# Patient Record
Sex: MALE | Race: WHITE
[De-identification: names, ages, dates, MRNs, and addresses within clinical notes are randomized per-mention and may not be internally consistent; named-entity substitution may affect disease eponyms.]

---

## 2022-01-07 ENCOUNTER — HOSPITAL ENCOUNTER (INPATIENT)
Dept: HOSPITAL 35 - ER | Age: 57
LOS: 5 days | Discharge: HOME HEALTH | DRG: 854 | End: 2022-01-12
Attending: INTERNAL MEDICINE | Admitting: INTERNAL MEDICINE
Payer: COMMERCIAL

## 2022-01-07 VITALS — BODY MASS INDEX: 33.33 KG/M2 | WEIGHT: 225 LBS | HEIGHT: 69.02 IN

## 2022-01-07 VITALS — DIASTOLIC BLOOD PRESSURE: 71 MMHG | SYSTOLIC BLOOD PRESSURE: 117 MMHG

## 2022-01-07 VITALS — DIASTOLIC BLOOD PRESSURE: 68 MMHG | SYSTOLIC BLOOD PRESSURE: 116 MMHG

## 2022-01-07 DIAGNOSIS — E44.1: ICD-10-CM

## 2022-01-07 DIAGNOSIS — S91.301A: ICD-10-CM
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
## 2022-01-07 DIAGNOSIS — E11.69: ICD-10-CM
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
## 2022-01-07 DIAGNOSIS — A41.9: Primary | ICD-10-CM

## 2022-01-07 DIAGNOSIS — Z20.822: ICD-10-CM

## 2022-01-07 DIAGNOSIS — E11.42: ICD-10-CM

## 2022-01-07 DIAGNOSIS — E66.9: ICD-10-CM
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
## 2022-01-07 DIAGNOSIS — L02.611: ICD-10-CM

## 2022-01-07 DIAGNOSIS — L03.115: ICD-10-CM

## 2022-01-07 DIAGNOSIS — Z86.14: ICD-10-CM

## 2022-01-07 DIAGNOSIS — Z88.2: ICD-10-CM

## 2022-01-07 DIAGNOSIS — R53.81: ICD-10-CM

## 2022-01-07 DIAGNOSIS — G60.0: ICD-10-CM
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
## 2022-01-07 DIAGNOSIS — M86.8X7: ICD-10-CM

## 2022-01-07 LAB
ALBUMIN SERPL-MCNC: 3.4 G/DL (ref 3.4–5)
ALT SERPL-CCNC: 58 U/L (ref 16–63)
ANION GAP SERPL CALC-SCNC: 9 MMOL/L (ref 7–16)
APTT BLD: 30.1 SECONDS (ref 24.5–32.8)
AST SERPL-CCNC: 21 U/L (ref 15–37)
BILIRUB SERPL-MCNC: 0.9 MG/DL (ref 0.2–1)
BUN SERPL-MCNC: 18 MG/DL (ref 7–18)
CALCIUM SERPL-MCNC: 8.8 MG/DL (ref 8.5–10.1)
CHLORIDE SERPL-SCNC: 103 MMOL/L (ref 98–107)
CO2 SERPL-SCNC: 23 MMOL/L (ref 21–32)
CREAT SERPL-MCNC: 1 MG/DL (ref 0.7–1.3)
ERYTHROCYTE [DISTWIDTH] IN BLOOD BY AUTOMATED COUNT: 12.8 % (ref 10.5–14.5)
GLUCOSE SERPL-MCNC: 137 MG/DL (ref 74–106)
GRANULOCYTES NFR BLD MANUAL: 92 % (ref 36–66)
HCT VFR BLD CALC: 41.5 % (ref 42–52)
HGB BLD-MCNC: 14.1 GM/DL (ref 14–18)
INR PPP: 1.03
LYMPHOCYTES NFR BLD AUTO: 3 % (ref 24–44)
MCH RBC QN AUTO: 29.8 PG (ref 26–34)
MCHC RBC AUTO-ENTMCNC: 33.9 G/DL (ref 28–37)
MCV RBC: 87.9 FL (ref 80–100)
MONOCYTES NFR BLD: 3 % (ref 1–8)
NEUTROPHILS # BLD: 19.6 THOU/UL (ref 1.4–8.2)
NEUTS BAND NFR BLD: 2 % (ref 0–8)
PLATELET # BLD EST: NORMAL 10*3/UL
PLATELET # BLD: 174 THOU/UL (ref 150–400)
POTASSIUM SERPL-SCNC: 4 MMOL/L (ref 3.5–5.1)
PROT SERPL-MCNC: 6.4 G/DL (ref 6.4–8.2)
PROTHROMBIN TIME: 11.2 SECONDS (ref 10.5–12.1)
RBC # BLD AUTO: 4.72 MIL/UL (ref 4.5–6)
RBC MORPH BLD: NORMAL
SODIUM SERPL-SCNC: 135 MMOL/L (ref 136–145)
WBC # BLD AUTO: 20.9 THOU/UL (ref 4–11)

## 2022-01-07 PROCEDURE — 50386 REMOVE STENT VIA TRANSURETH: CPT

## 2022-01-07 PROCEDURE — 50010 RENAL EXPLORATION: CPT

## 2022-01-07 PROCEDURE — 57091: CPT

## 2022-01-07 PROCEDURE — 62110: CPT

## 2022-01-07 PROCEDURE — 56525: CPT

## 2022-01-07 PROCEDURE — 62850: CPT

## 2022-01-07 PROCEDURE — 53341: CPT

## 2022-01-07 PROCEDURE — 27000 TENOTOMY ADDUCTOR HIP PERQ: CPT

## 2022-01-07 PROCEDURE — 50101: CPT

## 2022-01-07 PROCEDURE — 50951 ENDOSCOPY OF URETER: CPT

## 2022-01-07 PROCEDURE — 57178: CPT

## 2022-01-07 PROCEDURE — 70005: CPT

## 2022-01-07 PROCEDURE — 10195: CPT

## 2022-01-07 NOTE — HC
[facility]
[address] [place]   [address]                     CONSULTATION                  
_______________________________________________________________________________
 
Name:       OZMONELLU D                Room #:         439-P       [facility] [place]  
M.R.#:      [id number]                       Account #:      [id number]  
Admission:  01/07/22    Attend Phys:    Jeremias Maldonado MD      
Discharge:  01/12/22    Date of Birth:  [birth year]  
                                                          Report #: [id number]
                                                                    [id number] 
_______________________________________________________________________________
THIS REPORT FOR:  
 
cc:  Jeffrey Calabrese MD, Neal A. MD Stephens, Thad A. MD                                          ~
 
DATE OF SERVICE: 01/11/2022
 
WOUND CARE CONSULTATION
 
PERSONAL PHYSICIAN:  Jordana Reno DPM
 
CHIEF COMPLAINT:  Right foot surgical wound.
 
HISTORY OF PRESENT ILLNESS:  This is a 56-year-old white male with a history of 
longstanding diabetes mellitus and neuropathy as well as Charcot-Kimberly-Tooth 
disease.  The patient has been following with Dr. Reno for his Charcot foot 
and over the past several weeks, he has developed ulceration on the lateral 
aspect of his foot, which developed an abscess.  He had drained this 
approximately 3 times as an outpatient and then we saw the patient in clinic.  
His foot seemed more swollen and red and he felt ill.  The patient at that time 
was admitted for surgical debridement.  The patient is now status post partial 
right fifth ray resection.  We have been asked to follow the patient 
postoperatively for his wound care.
 
PAST MEDICAL HISTORY:  Significant for Charcot arthropathy of the right foot, 
diabetes mellitus, previous surgery on the right foot for reconstruction of the 
Charcot foot and Charcot-Kimberly-Tooth disease.
 
CURRENT MEDICATIONS:  Multiple, reviewed the patient's medication list.
 
DRUG ALLERGIES:  BACTRIM.
 
SOCIAL HISTORY:  The patient resides at home with his wife, works at home, does 
not smoke or drink alcohol.
 
FAMILY HISTORY:  Not pertinent to current medical condition.
 
REVIEW OF SYSTEM:
CONSTITUTIONAL:  The patient denies actual fevers or chills.
NEUROLOGIC:  The patient denies numbness or tingling or weakness in arms or 
legs.
EYES:  No complaints.
EARS, NOSE AND THROAT:  No complaints.
CARDIAC:  The patient denies chest pain, palpitations, peripheral edema.
RESPIRATORY:  The patient denies shortness of breath, cough or wheezes.
 
 
 
[facility]
[address] [place]   [address]                     CONSULTATION                  
_______________________________________________________________________________
 
Name:       OZLUL D                Room #:         439-P       [facility] [place]  
.R.#:      [id number]                       Account #:      [id number]  
Admission:  01/07/22    Attend Phys:    Jeremias Maldonado MD      
Discharge:  01/12/22    Date of Birth:  [birth year]  
                                                          Report #: [id number]
                                                                    [id number] 
_______________________________________________________________________________
 
GASTROINTESTINAL:  The patient has nausea, vomiting, abdominal pain.
GENITOURINARY:  The patient denies urgency or frequency.
MUSCULOSKELETAL:  The patient has Charcot arthropathy on the right foot.
SKIN:  There is a surgical wound to the right lateral foot.
NEUROLOGIC:  Cranials II-XII grossly intact.  Motor and sensory grossly intact.
 
LABORATORY DATA:  White count 6.0, hemoglobin 13.9.  Sed rate 31, BUN 15, 
creatinine 0.8, albumin 3.0.
 
IMPRESSION:
1.  Neuropathic ulcer, lateral aspect of the right foot related to 
Charcot-Kimberly-Tooth disease, now status post partial fifth ray resection.
2.  Diabetes mellitus with neuropathy.
3.  Obesity.
4.  Generalized debility.
5.  Mild protein-calorie malnutrition with albumin 3.0.
 
PLAN:  Dressing today was changed at the bedside with Aquacel Ag.  The patient's
wife is shown how to do the dressing.  The patient will be on postop shoe with 
toe touch weightbearing for transfer.  The patient, however, is supposed to be 
nonweightbearing.  Per Podiatry using a knee scooter.  Antibiotics have been 
ordered for the PICC line.  I did speak with Dr. Reno about the patient's 
care.  We will follow the patient up later this week for further orders and 
dressing orders for the wife to follow.  We will make sure we maximize the 
patient's oral protein supplementation for healing.  We will continue all other 
current medications.
 
 
 
 
 
 
 
 
 
 
 
 
 
 
 
 
 
                         
   By:                               
                   
D: 01/12/22 1428                           _____________________________________
T: 01/12/22 2147                           Mirza Ruiz MD            /nt

## 2022-01-08 VITALS — DIASTOLIC BLOOD PRESSURE: 67 MMHG | SYSTOLIC BLOOD PRESSURE: 96 MMHG

## 2022-01-08 VITALS — SYSTOLIC BLOOD PRESSURE: 134 MMHG | DIASTOLIC BLOOD PRESSURE: 74 MMHG

## 2022-01-08 VITALS — SYSTOLIC BLOOD PRESSURE: 115 MMHG | DIASTOLIC BLOOD PRESSURE: 61 MMHG

## 2022-01-08 LAB
ANION GAP SERPL CALC-SCNC: 10 MMOL/L (ref 7–16)
BASOPHILS NFR BLD AUTO: 0.3 % (ref 0–2)
BUN SERPL-MCNC: 15 MG/DL (ref 7–18)
CALCIUM SERPL-MCNC: 8.2 MG/DL (ref 8.5–10.1)
CHLORIDE SERPL-SCNC: 106 MMOL/L (ref 98–107)
CO2 SERPL-SCNC: 25 MMOL/L (ref 21–32)
CREAT SERPL-MCNC: 0.8 MG/DL (ref 0.7–1.3)
EOSINOPHIL NFR BLD: 0.7 % (ref 0–3)
ERYTHROCYTE [DISTWIDTH] IN BLOOD BY AUTOMATED COUNT: 13 % (ref 10.5–14.5)
GLUCOSE SERPL-MCNC: 108 MG/DL (ref 74–106)
GRANULOCYTES NFR BLD MANUAL: 81.1 % (ref 36–66)
HCT VFR BLD CALC: 37.9 % (ref 42–52)
HGB BLD-MCNC: 12.6 GM/DL (ref 14–18)
LYMPHOCYTES NFR BLD AUTO: 10.2 % (ref 24–44)
MAGNESIUM SERPL-MCNC: 2 MG/DL (ref 1.8–2.4)
MCH RBC QN AUTO: 29.6 PG (ref 26–34)
MCHC RBC AUTO-ENTMCNC: 33.1 G/DL (ref 28–37)
MCV RBC: 89.3 FL (ref 80–100)
MONOCYTES NFR BLD: 7.7 % (ref 1–8)
NEUTROPHILS # BLD: 9.8 THOU/UL (ref 1.4–8.2)
PLATELET # BLD: 154 THOU/UL (ref 150–400)
POTASSIUM SERPL-SCNC: 4 MMOL/L (ref 3.5–5.1)
RBC # BLD AUTO: 4.25 MIL/UL (ref 4.5–6)
SODIUM SERPL-SCNC: 141 MMOL/L (ref 136–145)
WBC # BLD AUTO: 12 THOU/UL (ref 4–11)

## 2022-01-08 NOTE — NUR
PT WAS ADMITTED TO THE UNIT FROM THE ER IN A STABLE CONDITION.ADMISSION
COMPLETED.DRSG TO HIS R FOOT C/D/I.FOOT ELEVATED WITH A PILLOW.PER
PHYSICIAN,"DO NOT TOUCH DRSG", WILL BE CHANGED BY PHYSICIAN TODAY.PT CONT ON
IVF AND IV ABX AS ORDERED.PT ABLE TO MAKE HIS NEEDS KNOWN.CALL LIGHT WITHIN
REACH.

## 2022-01-08 NOTE — HC
[facility]
[address]
[place]   [address]                     CONSULTATION                  
_______________________________________________________________________________
 
Name:       LUL CLINTON CHASE                Room #:         439-P       ADM [place]  
M.R.#:      [id number]                       Account #:      [id number]  
Admission:  01/07/22    Attend Phys:    Jeremias Maldonado MD      
Discharge:              Date of Birth:  [birth year]  
                                                          Report #: [id number]
                                                                    [id number] 
_______________________________________________________________________________
THIS REPORT FOR:  
 
cc:  Jeffrey Calabrese MD, Neal A. MD Jameson, Stephanie L. DPM                                     ~
 
DATE OF SERVICE: 01/08/2022
 
HISTORY OF PRESENT ILLNESS:  This is a 56-year-old male who was admitted
through the
ER yesterday afternoon after being seen in my Podiatry clinic. 
He has a significant history of bilateral foot and ankle deformity with 
Charcot-Kimberly-Tooth disease.
He has had multiple surgeries on the right foot through
the years for his underlying deformity, but none recently.
Over the last year, he has been
battling a recurrent ulceration of the right foot.  When the patient came to my 
office yesterday accompanied by his wife, they said it had 
 turned red and infected over the last 1-2 days.
He actually had some nausea and
vomiting yesterday too.
Incision and drainage of an abscess was performed in my
office yesterday with cultures taken and the wound was packed and dressed. I
told him to go to the Emergency Room for evaluation and admission for further 
workup with imaging and IV antibiotics.
 
LOWER EXTREMITY PHYSICAL EXAMINATION:  Bilateral feet with equinovarus and 
metatarsus adductus with tight heel cords and left foot without any breaks in 
the skin. Both feet with significant areas of absent to decreased sensation, 
more so on the dorsal and lateral aspect of the foot along the extensors and the
peroneals.  On the right foot, there is now an open incision along the lateral 
fifth metatarsal distal aspect measuring about probably 4 cm in linear fashion 
with packing in place.  There was a previous ulceration sub fifth met
distal shaft
 
aspect, as he had previous resection years ago of 5th met head,
and the ulceration with tracking from this site to the area of the abscess
that was incised on the lateral aspect.
. Yesterday, there was previously a large amount of purulent drainage.  At
this time, today, there was minimal purulence and now with more granular 
ulceration and bleeding
that does probe deep to the fifth metatarsal shaft.  With
compression, there was no further purulence expressed from all angles.  Edema 
and erythema are slowly resolving but still present,
and there is no new pain or tenderness
to this site on the right foot.  There is no malodor present and no further 
 
 
 
[address]   [address]                     CONSULTATION                  
_______________________________________________________________________________
 
Name:       LUL CLINTON                Room #:         439-P       [facility] [place]  
.R.#:      [id number]                       Account #:      [id number]  
Admission:  01/07/22    Attend Phys:    Jeremias Maldonado MD      
Discharge:              Date of Birth:  [birth year]  
                                                          Report #: [id number]
                                                                    [id number] 
_______________________________________________________________________________
 
obvious abscess or fluctuance.
 
ASSESSMENT AND PLAN:  A 56-year-old male admitted yesterday with a history of 
Charcot-Kimberly-Tooth and foot deformity with abscess right foot and probable 
osteomyelitis.
 
PLAN: Currently afebrile. Prior to admission, had fevers.  States today he feels
much better than he did yesterday overall.  His white blood cell count went from
a 20 on admission and is now at 12.  His x-ray had concerns of bony changes 
noted to that fifth metatarsal proximal shaft.  Due to the way he walks and with
his Charcot-Kimberly-Tooth, he is likely breaking down with neuroarthropathy to
this
area, but in
addition there is likely bone infection.  The MRI was ordered and is currently 
pending.  Another bedside incision and drainage was performed today to confirm 
that there were no
further new pockets of infection present.  The patient already
had an absent fifth metatarsal head from previous surgery and appears to have 
degenerative changes also along the tarsometatarsal joints.  At this time, 
recommend nonweightbearing right foot.  A new sterile dressing was applied to 
the right foot with light packing to the incision.  Infectious Disease consult 
is pending.  Wound Care consult is pending.  His current blood cultures are 
negative.  A wound culture was obtained in my office yesterday and I will make 
sure records of these results get faxed over the floor for review..
In addition, another culture was
obtained in the ER, but appears likely
superficial from discussion.  A deeper wound culture was
also obtained today at bedside again.
  I discussed with patient that this is 
limb threatening, and pending further workup will likely require 
surgery to remove infected bone.  He is currently systemically stable
at this time, but
discussed that he will likely require further bone debridement with this 
admission and he understands.
 
 
 
 
 
 
 
 
 
  <ELECTRONICALLY SIGNED>
   By: Jordana Reno DPM     
  01/08/22     1348
D: 01/08/22 0844                           _____________________________________
T: 01/08/22 0909                           Jordana Reno DPM       /nt

## 2022-01-08 NOTE — NUR
ASSUMED PT CARE THIS AM. PT IS ALERT & ORIENTED X4. PT HAS IV SITE ON LFA. PT
IS UP WITH ASSIST X1. PT IS ON ROOM AIR. TAKEN WOUND PICTURES THIS AM BUT
UNABLE TO PRINT DUE TO PRINTER NOT WORKING. ORDERED MRI AND SENT CULTURE
AEROBIC/ANAEROBIC AS PER DR PITTS ORDERED. INFORMED DR PITTS THAT MRI WILL
BE DONE ON MONDAY AS PER RADIOLOGY. AS PER DR PITTS IF ABNORMAL MRI, PT WILL
PROBABLY NEED SURGERY AND PROBABLY WILL BE NPO ON MONDAY. PT IS NONWEIGHT
BEARING ON R FOOT. WILL CONTINUE TO MONITOR PT. FOLLOW POC.

## 2022-01-09 VITALS — DIASTOLIC BLOOD PRESSURE: 58 MMHG | SYSTOLIC BLOOD PRESSURE: 110 MMHG

## 2022-01-09 VITALS — DIASTOLIC BLOOD PRESSURE: 79 MMHG | SYSTOLIC BLOOD PRESSURE: 120 MMHG

## 2022-01-09 VITALS — SYSTOLIC BLOOD PRESSURE: 115 MMHG | DIASTOLIC BLOOD PRESSURE: 60 MMHG

## 2022-01-09 NOTE — HC
[facility]
[address]
[place]   [address]                     CONSULTATION                  
_______________________________________________________________________________
 
Name:       LUL CLINTON CHASE                Room #:         439-P       ADM [place]  
M.R.#:      [id number]                       Account #:      [id number]  
Admission:  01/07/22    Attend Phys:    Jeremias Maldonado MD      
Discharge:              Date of Birth:  [birth year]  
                                                          Report #: [id number]
                                                                    [id number] 
_______________________________________________________________________________
THIS REPORT FOR:  
 
cc:  Jeffrey Calabrese MD, Neal A. MD Geha,Jm MORENO MD                                            ~
 
DATE OF SERVICE: 01/08/2022
 
INFECTIOUS DISEASE CONSULTATION
 
REASON FOR CONSULTATION:  I was asked to evaluate concerning right foot soft 
tissue infection.
 
HISTORY OF PRESENT ILLNESS:  The patient is a 56-year-old admitted yesterday 
from the podiatry clinic with a right foot wound.  He has underlying 
Charcot-Kimberly-Tooth disease.  He has had multiple issues with his right foot 
including reconstructive surgery by Dr. Still several years ago.  Still has 
Charcot deformities.  Last week, developed an ulceration over the lateral aspect
of his fifth metatarsal on the right.  This worsened 2 days ago, presented to 
the outpatient podiatry clinic, where incision and drainage was accomplished and
sent in for further care.  Cultures are pending.  He is placed on vancomycin and
clindamycin.  Initially had a low-grade fever, which has resolved.  White count 
on admission was 20,000.  He has had a history of MRSA infections in the past.
 
Review of Podiatry evaluation did note that following debridement, there was 
exposure down to the shaft of the fifth metatarsal.
 
REVIEW OF SYSTEMS:  A 14-point review of system was negative other than what has
been described above.
 
ALLERGIES:  BACTRIM.
 
MEDICATIONS:  As noted on his MAR, which were reviewed.
 
PAST MEDICAL HISTORY:  Charcot-Kimberly-Tooth disease and Charcot feet bilaterally.
 Multiple surgical interventions.  Previous MRSA infection.
 
FAMILY HISTORY:  Charcot-Kimberly-Tooth.
 
SOCIAL HISTORY:  Nonsmoker.  Occasional alcohol.  Lives with his wife, has a 
desk job.
 
PHYSICAL EXAMINATION:
GENERAL:  Afebrile and hemodynamically stable.  He is alert, cooperative and 
pleasant.  No acute distress.  Moderately obese.
SKIN:  Without rash or decubitus.  Right foot was wrapped from his recent 
 
 
 
[facility]
[address]
[place]   [address]                     CONSULTATION                  
_______________________________________________________________________________
 
Name:       LUL CLINTON CHASE                Room #:         439-P       ADM [place]  
M.R.#:      [id number]                       Account #:      [id number]  
Admission:  01/07/22    Attend Phys:    Jeremias Maldonado MD      
Discharge:              Date of Birth:  [birth year]  
                                                          Report #: [id number]
                                                                    [id number] 
_______________________________________________________________________________
 
dressing change.  I did review Podiatry evaluation, noting the wound parameters.
LUNGS:  Clear.
HEART:  Regular.
ABDOMEN:  Soft and nontender.
 
LABORATORY DATA:  Reviewed.
 
MICROBIOLOGY:  Pending.
 
IMAGING:  X-ray showed no acute osseous abnormality.  MRI scan has been ordered.
 
IMPRESSION:  A 56-year-old with Charcot-Kimberly-Tooth soft tissue infection, 
lateral right foot, with exposure of the fifth metatarsal, concerning for 
possible underlying osteomyelitis.
 
RECOMMENDATIONS:  We will check the MRI scan to assess for any bony change.  
Await cultures.  Check inflammatory markers.  Continue IV antibiotic therapy.  
Further recommendations following MRI scan.  If not done previously, would 
consider arterial studies if there is evidence of poor healing.
 
 
 
 
 
 
 
 
 
 
 
 
 
 
 
 
 
 
 
 
 
 
 
 
  <ELECTRONICALLY SIGNED>
   By: Jm Cummins MD            
  01/09/22 2026
D: 01/08/22 1836                           _____________________________________
T: 01/08/22 2131                           Jm Cummins MD              /nt

## 2022-01-09 NOTE — NUR
PT AXOX4.PT DENIED PAIN SO FAR.DRSG TO HIS R FOOT C/D/I.UP WITH SBA IN ROOM.PT
CONT ON IV AB AS ORDERED.PT ABLE TO MAKE HIS NEEDS KNOWN.PT PROGRESSING SLOWLY
TOWARDS DC GOALS

## 2022-01-09 NOTE — NUR
ASSUMED PT CARE THIS AM. PT HAS IV SITE ON LFA SALINE LOCKED. NO C/O OF PAIN,
NAUSEA AND VOMITING. INFORMED PT THAT DR PITTS WILL BE HERE THIS AFTERNOON.
PT TOLERATED DIET AND MEDICATION WELL. WILL CONTINUE TO MONITOR PT. FOLLOW
POC.

## 2022-01-10 VITALS — SYSTOLIC BLOOD PRESSURE: 115 MMHG | DIASTOLIC BLOOD PRESSURE: 60 MMHG

## 2022-01-10 VITALS — DIASTOLIC BLOOD PRESSURE: 86 MMHG | SYSTOLIC BLOOD PRESSURE: 121 MMHG

## 2022-01-10 VITALS — SYSTOLIC BLOOD PRESSURE: 110 MMHG | DIASTOLIC BLOOD PRESSURE: 63 MMHG

## 2022-01-10 VITALS — DIASTOLIC BLOOD PRESSURE: 76 MMHG | SYSTOLIC BLOOD PRESSURE: 118 MMHG

## 2022-01-10 VITALS — SYSTOLIC BLOOD PRESSURE: 128 MMHG | DIASTOLIC BLOOD PRESSURE: 88 MMHG

## 2022-01-10 LAB
ERYTHROCYTE [DISTWIDTH] IN BLOOD BY AUTOMATED COUNT: 13 % (ref 10.5–14.5)
HCT VFR BLD CALC: 42.1 % (ref 42–52)
HGB BLD-MCNC: 13.9 GM/DL (ref 14–18)
MCH RBC QN AUTO: 29.7 PG (ref 26–34)
MCHC RBC AUTO-ENTMCNC: 33.1 G/DL (ref 28–37)
MCV RBC: 89.8 FL (ref 80–100)
PLATELET # BLD: 228 THOU/UL (ref 150–400)
RBC # BLD AUTO: 4.69 MIL/UL (ref 4.5–6)
WBC # BLD AUTO: 6 THOU/UL (ref 4–11)

## 2022-01-10 PROCEDURE — 0Y6M0ZF DETACHMENT AT RIGHT FOOT, PARTIAL 5TH RAY, OPEN APPROACH: ICD-10-PCS | Performed by: PODIATRIST

## 2022-01-10 NOTE — NUR
WOUND CONSULT;
DR PITTS IS ON THE CARE AND MANAGEING THE CARE OF THE RIGHT FOOT.  WOUND
CARE WILL SIGN OFF AT THIS TIME.  RECONSULT IF NEEDED.
 
INPATIENT WOUND CARE COORDINATOR WILL SIGN OFF.

## 2022-01-10 NOTE — NUR
Patient is A&Ox4 and makes needs known. Patient was able to eat breakfast &
went for MRI.  rounded on patient and did wound dressing stating
patient is looking so much better. MD educated patient on procedure. Patient
NPO and awaiting surgery. SBA with no issues. Will continue to monitor

## 2022-01-10 NOTE — NUR
Pt  able to move from chair to bed with SBA. Uses urinal. R foot elevated for
some trace edema.Afebrile.

## 2022-01-11 VITALS — SYSTOLIC BLOOD PRESSURE: 126 MMHG | DIASTOLIC BLOOD PRESSURE: 96 MMHG

## 2022-01-11 VITALS — DIASTOLIC BLOOD PRESSURE: 69 MMHG | SYSTOLIC BLOOD PRESSURE: 114 MMHG

## 2022-01-11 VITALS — DIASTOLIC BLOOD PRESSURE: 65 MMHG | SYSTOLIC BLOOD PRESSURE: 114 MMHG

## 2022-01-11 NOTE — NUR
Met with patient who admits with right foot cellulitis. Patient reports he is
working from home. Lives at home with wife. Wife retired. PTA independent with
adls and self care. No assistive device pta. Patient independent with all
adls. Plan home independent. Patient has right foot dressing change which he
reports wife able to assist. He reports adequate time off from work. Casemgt
following.

## 2022-01-11 NOTE — NUR
ASSUMED PT CARE THIS AM. PT IS ALERT & ORIENTED X4. PT HAS IV SITE ON L HAND
SALINE LOCKED. AWAITING FOR IV TEAM TO PLACE PICC LINE. PT HAD R FIFTH TOE
AMPUTATION YESTERDAY BY DR PITTS. PT IS ON ROOM AIR. APPLIED TERBINATE
TOPICALLY ON BUTTOCK AND UPPER THIGH RASH THIS AM. PT C/O OF PAIN AND GIVEN
PAIN MEDICATION AS PER PT REQUEST. WILL CONTINUE TO MONITOR PT. FOLLOW POC.

## 2022-01-11 NOTE — NUR
ORDERS RECEIVED FOR EVAL AND TREAT NWB.  SPOKE WITH Pt WHO STATES HE HAS USED
CRUTCHES BEFORE AND HAS A KNEE SCOOTER.  OBSERVED HIM STAND AND AMBULATE IN
ROOM WITH CRUTCHES WITH DIFFICULTY.  Pt DECLINING A FORMAL P.T. EVAL BUT
APPEARS SAFE FOR HOME

## 2022-01-11 NOTE — O
[facility]
[address]
[place]   [address]                     OPERATIVE REPORT              
_______________________________________________________________________________
 
Name:       LUL CLINTON                Room #:         439-P       ADM [place]  
M.R.#:      [id number]                       Account #:      [id number]  
Admission:  01/07/22    Attend Phys:    Jeremias Maldonado MD      
Discharge:              Date of Birth:  [birth year]  
                                                          Report #: [id number]
                                                                    [id number] 
_______________________________________________________________________________
THIS REPORT FOR:  
 
cc:  Jeffrey Calabrese MD, Neal A. MD Jameson, Stephanie L. DPM                                     ~
 
DATE OF SERVICE: 01/10/2022
 
PREOPERATIVE DIAGNOSIS:  Abscess and osteomyelitis of the right fifth 
metatarsal.
 
POSTOPERATIVE DIAGNOSIS:  Abscess and osteomyelitis of the right fifth 
metatarsal.
 
SURGEON:  Jordana Reno DPM
 
OPERATION PERFORMED:  Right foot debridement, incision and drainage of all 
nonviable tissue and bone with partial fifth ray amputation.
 
ANESTHESIA:  MAC with a local block.
 
ESTIMATED BLOOD LOSS:  10 mL.
 
SPECIMEN REMOVED:  Bone of the fifth metatarsal shaft for biopsy and culture.
 
COMPLICATIONS:  None.
 
DESCRIPTION OF PROCEDURE:  After consent was obtained for procedure, the patient
was taken from the preoperative holding area to the operating room.  The right 
foot was prepped and draped in the usual aseptic fashion.  The ankle tourniquet 
was inflated to 250 mmHg.  A towel clamp was placed on the right fifth toe and a
#10 blade knife was then used to make an incision just at the skin at the joint 
level of the metatarsal and included the right fifth toe.  This was carried back
proximally along the lateral fifth metatarsal where the previous incision and 
drainage incision had been placed.  The fifth toe was disarticulated and passed 
to the back table.  Using sharp and blunt dissection, it was carried down and 
all nonviable soft tissue present at the area along the fifth metatarsal shaft 
was removed.  A sagittal saw was used to remove all nonviable and degenerative 
fifth metatarsal bone
present along the shaft.  The remaining fifth metatarsal base appeared
healthy and viable.  Inspection was made.  
The nonviable
tissue and bone to the fifth metatarsal shaft was passed to the back
table and sent for biopsy specimen and culture.  The wound was irrigated with 
copious amounts sterile
saline.  There was good
 
 
 
[facility]
[address]
[place]   [address]                     OPERATIVE REPORT              
_______________________________________________________________________________
 
Name:       LUL CLINTON                Room #:         439-P       ADM [place]  
M.R.#:      [id number]                       Account #:      [id number]  
Admission:  01/07/22    Attend Phys:    Jeremias Maldonado MD      
Discharge:              Date of Birth:  [birth year]  
                                                          Report #: [id number]
                                                                    [id number] 
_______________________________________________________________________________
 
coverage of the remaining bone, the soft tissue and skin.  I did close a good 
portion of the incision with 2-0 nylon suture in mix of simple and mattress 
fashion sutures except for the area of previous incision on the lateral aspect 
and also a semielliptical incision was made plantar sub fifth met head and shaft
aspect where the previous ulcer was present, measuring approximately 2 cm, both 
this area in the lateral aspect were packed with light packing of iodoform and 
left open secondarily due to recent infection in the area but the tissues
appeared healthy.
The tourniquet was deflated and a
postoperative block using Marcaine was performed.  The wound was dressed with 
Betadine-soaked Adaptic, 4 x 4 gauze, Kerlix, and Ace bandage.  The patient was 
transferred to the PACU in stable condition and will return back to inpatient 
floor.
 
 
 
 
 
 
 
 
 
 
 
 
 
 
 
 
 
 
 
 
 
 
 
 
 
 
 
 
 
 
  <ELECTRONICALLY SIGNED>
   By: Jordana Reno DPM     
  01/11/22     0716
D: 01/10/22 2039                           _____________________________________
T: 01/10/22 2122                           Jordana Reno DPM       /nt

## 2022-01-11 NOTE — NUR
Pt arrived in the unit post op at around 2015 hrs. R foot wrapped in ace,
elevated on 2 pillows.Pt has continued to have pain in the 4-5 range. Norco
given prn.Voiding okay. denies any nausea or vomiting.ID rounded on patient
and discussed POC, Order for PICC placement today.PT continues on IV ABTS and
fluids. He calls with needs.

## 2022-01-12 VITALS — SYSTOLIC BLOOD PRESSURE: 123 MMHG | DIASTOLIC BLOOD PRESSURE: 72 MMHG

## 2022-01-12 VITALS — SYSTOLIC BLOOD PRESSURE: 123 MMHG | DIASTOLIC BLOOD PRESSURE: 76 MMHG

## 2022-01-12 NOTE — NUR
[facility] accepting of patient for home infusion. Teaching at bedside today at
1200. Patient to dc home. Some supplies given to patient for dressing changge.
Spoke with [facility] patient on schedule for Thursday for home infusion and home
health care. Liason with [facility] reports teaching went well with patient and
wife. Faxed final orders which they rec.

## 2022-01-12 NOTE — NUR
Pt. rested quietly during the night when checked on during frequent rounds.
He was given po pain meds (see emar) for c/o right foot pain with some relief
noted.  No c/o nausea or vomiting.  Dressing to right foot is clean and dry.
Right foot elevated up on pillow.

## 2022-01-12 NOTE — NUR
EXAMINATION note    Chief Complaint   Patient presents with   • Follow-up     4 week followup for acute diarrhea. Patient states he is feeling better.  States he was taking 3000 mg of Metformin daily, instead of 2000 mg daily. Feels this was the cause of diarrhea.          History:    Joe Gonzáles is a 57 year old male who presents for follow-up of diarrhea.  He states that his diarrhea is resolved now.  He notes that it improved when he realized that he was taking 3000 mg of Metformin daily.  He states that his pharmacy had refilled his prescription from his prior doctor and from this office.  He has now resumed his prescribed dose of Metformin 1000 mg BID and is feeling well.  He states he watches his diet, but does indulge in sweets sometimes.  He does report having some discomfort in his left hip.  He states he has pain with activity such as climbing stairs and pain when he lays on that hip.  The pain has been gradually worsening over time.  The weight today 272 lbs.  His blood pressure in office today is 138/92.  He did not take his blood pressure medication yet today.  Denies any dizziness or lightheadedness when going from a sitting to standing position. States taking medications as directed with no side effects. The patient denies chest pain, shortness of breath, palpitations, headaches or lower extremity edema.  I have reviewed the past medical, family and social history sections including the medications and allergies listed in the above medical record as well as the nursing notes.     There are no problems to display for this patient.      Current Outpatient Medications   Medication Sig   • levocetirizine (XYZAL) 5 MG tablet TAKE 1 TABLET BY MOUTH EVERY EVENING   • liraglutide (VICTOZA) 18 MG/3ML pen-injector Inject 0.6 mg into the skin daily.   • traMADol (ULTRAM) 50 MG tablet Take 1 tablet by mouth every 6 hours as needed for Pain.   • mupirocin (Bactroban) 2 % ointment Apply topically 3 times  PICC PLACED FOR HOME ABX daily.   • canagliflozin (Invokana) 300 MG tablet Take 1 tablet by mouth daily (before breakfast).   • levothyroxine 137 MCG tablet Take 1 tablet by mouth daily.   • metFORMIN (GLUCOPHAGE) 1000 MG tablet Take 1 tablet by mouth 2 times daily.   • glipiZIDE (GLUCOTROL) 10 MG tablet Take 1 tablet by mouth 2 times daily.   • rosuvastatin (Crestor) 20 MG tablet Take 1 tablet by mouth daily.   • montelukast (SINGULAIR) 10 MG tablet Take 1 tablet by mouth nightly.   • albuterol (VENTOLIN) (2.5 MG/3ML) 0.083% nebulizer solution USE 1 VIAL VIA NEBULIZER EVERY 4 HOURS AS NEEDED   • albuterol 108 (90 Base) MCG/ACT inhaler Inhale 2 puffs into the lungs every 6 hours as needed.   • irbesartan (AVAPRO) 150 MG tablet Take 150 mg by mouth daily.   • Advair Diskus 500-50 MCG/DOSE inhaler 1 puff 2 times daily.   • nitroGLYcerin (NITRODUR) 0.2 MG/HR patch APPLY 1 PATCH EXTERNALLY TO THE SKIN DAILY   • esomeprazole (NexIUM) 40 MG capsule Take 40 mg by mouth.   • metoPROLOL succinate (TOPROL-XL) 100 MG 24 hr tablet Take 50 mg by mouth 2 times daily.   • clopidogrel (PLAVIX) 75 MG tablet Take 75 mg by mouth daily.   • aspirin 325 MG tablet Take 325 mg by mouth daily.   • Potassium Gluconate 550 MG Tab Take 550 mg by mouth daily.   • Omega-3 Fatty Acids (Fish Oil) 1000 MG CAPSULE DELAYED RELEASE Take 2,000 mg by mouth 2 times daily.   • EPINEPHrine 0.3 MG/0.3ML auto-injector Inject 0.3 mg into the muscle 1 time as needed for Anaphylaxis.     No current facility-administered medications for this visit.       Vital Signs:    Vitals:    08/04/21 0831 08/04/21 0848   BP: (!) 138/92 (!) 150/100   Pulse: (!) 107    Temp: 98 °F (36.7 °C)    SpO2: 95%    Weight: 123.4 kg    Height: 5' 7\" (1.702 m)        Review of systems;    Constitutional:  Patient denies fever, chills, tiredness or malaise.    Eyes:  Denies change in visual acuity, pain, burning or itching.    HENT:  Denies sinus problems, ear infections, nasal congestion or sore throat.     Respiratory:  Denies cough, shortness of breath.    Cardiovascular:  Denies chest pain, edema.    Gastrointestinal:  Denies abdominal pain, nausea, vomiting, bloody stools or diarrhea.    Musculoskeletal:  Denies back pain, neck pain or leg swelling.  Positive for left hip pain.  Integument:  Denies rash, itching.    Neurologic:  Denies headache, focal weakness or sensory changes.      All other systems reviewed and negative      Physical ExamINATION:    Constitutional:  In no acute distress.  Appears stated age.  Cooperative throughout exam.   Integument:  Warm.  Dry.  No erythema.  No rash.    Eyes:  PERRL (Pupils equal, round, reactive to light), EOMI (extraocular movements intact).  Conjunctivae normal.  No discharge.   HENT:  Normocephalic.  Atraumatic.  Bilateral external ears normal.  Oropharynx moist. No oral exudates.  Nose normal.   Neck:  Normal range of motion.  No masses.  No tenderness.  Supple.  No stridor.    Respiratory:  Normal breath sounds.  No respiratory distress.  No wheezing.  No crackles.  No rhonchi.  No chest tenderness.    Cardiovascular:  Normal heart rate.  Normal rhythm.  No murmurs.  No rubs.  No gallops.    Gastrointestinal:  Bowel sounds normal.  Soft.  No tenderness.  No masses.  No pulsatile masses.  No hepatomegaly.  No splenomegaly.  Musculoskeletal:  Normal strength.  Normal reflexes.    Assessment AND Plan:      1. Screening for colorectal cancer  Referred for colonoscopy.  - OPEN ACCESS COLONOSCOPY    2. Chronic left hip pain  Will have x-ray of the left hip and will call with results and recommendations.  - XR HIP 2 VW LEFT; Future    3.  Diarrhea  Resolved.  Likely due to high dose of metformin.  Patient did decrease the dose to 1000 b.i.d.      On 8/4/2021, Elmira CASTLE RN scribed the services personally performed by Erwin Jaeger MD.         The documentation recorded by the scribe accurately and completely reflects the service(s) I personally performed and the  decisions made by me.

## 2022-01-12 NOTE — NUR
ASSUMED PT CARE THIS AM. GIVEN SCHEDULED MEDICATIONS AS PER ORDERED. ASSISTED
DR PITTS TO DO WOUND DRESSING ON THE RIGHT FOOT. TOOK A PICTURE AND PLACED
IN THE CHART. AWAITING FOR IV TEAM TO PLACE PICC LINE. PT WIFE AT THE BEDSIDE.
WILL CONTINUE TO MONITOR PT. FOLLOW POC.

## 2022-01-19 ENCOUNTER — HOSPITAL ENCOUNTER (OUTPATIENT)
Dept: HOSPITAL 35 - HYPER | Age: 57
End: 2022-01-19
Attending: PREVENTIVE MEDICINE
Payer: COMMERCIAL

## 2022-01-19 DIAGNOSIS — E66.9: ICD-10-CM
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
## 2022-01-19 DIAGNOSIS — T87.81: Primary | ICD-10-CM

## 2022-01-19 DIAGNOSIS — L84: ICD-10-CM

## 2022-01-19 DIAGNOSIS — L97.512: ICD-10-CM

## 2022-01-26 ENCOUNTER — HOSPITAL ENCOUNTER (OUTPATIENT)
Dept: HOSPITAL 35 - HYPER | Age: 57
End: 2022-01-26
Attending: PREVENTIVE MEDICINE
Payer: COMMERCIAL

## 2022-01-26 DIAGNOSIS — L84: ICD-10-CM
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
## 2022-01-26 DIAGNOSIS — T81.31XD: Primary | ICD-10-CM

## 2022-01-26 DIAGNOSIS — E66.9: ICD-10-CM

## 2022-01-26 DIAGNOSIS — L97.512: ICD-10-CM

## 2022-02-03 ENCOUNTER — HOSPITAL ENCOUNTER (OUTPATIENT)
Dept: HOSPITAL 35 - HYPER | Age: 57
End: 2022-02-03
Attending: PREVENTIVE MEDICINE
Payer: COMMERCIAL

## 2022-02-03 DIAGNOSIS — T81.31XD: Primary | ICD-10-CM

## 2022-02-03 DIAGNOSIS — L97.512: ICD-10-CM

## 2022-02-03 DIAGNOSIS — L84: ICD-10-CM

## 2022-02-03 DIAGNOSIS — E66.9: ICD-10-CM
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
## 2022-02-09 ENCOUNTER — HOSPITAL ENCOUNTER (OUTPATIENT)
Dept: HOSPITAL 35 - HYPER | Age: 57
End: 2022-02-09
Attending: PREVENTIVE MEDICINE
Payer: COMMERCIAL

## 2022-02-09 DIAGNOSIS — E66.9: ICD-10-CM
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
## 2022-02-09 DIAGNOSIS — L84: ICD-10-CM

## 2022-02-09 DIAGNOSIS — L97.512: ICD-10-CM

## 2022-02-09 DIAGNOSIS — T81.31XD: Primary | ICD-10-CM
